# Patient Record
Sex: FEMALE | Race: WHITE
[De-identification: names, ages, dates, MRNs, and addresses within clinical notes are randomized per-mention and may not be internally consistent; named-entity substitution may affect disease eponyms.]

---

## 2018-07-25 ENCOUNTER — HOSPITAL ENCOUNTER (EMERGENCY)
Dept: HOSPITAL 45 - C.EDB | Age: 33
Discharge: HOME | End: 2018-07-25
Payer: COMMERCIAL

## 2018-07-25 VITALS — TEMPERATURE: 97.88 F

## 2018-07-25 VITALS — HEART RATE: 84 BPM | OXYGEN SATURATION: 100 % | DIASTOLIC BLOOD PRESSURE: 78 MMHG | SYSTOLIC BLOOD PRESSURE: 123 MMHG

## 2018-07-25 VITALS
BODY MASS INDEX: 21.94 KG/M2 | HEIGHT: 60.98 IN | WEIGHT: 116.18 LBS | HEIGHT: 60.98 IN | BODY MASS INDEX: 21.94 KG/M2 | WEIGHT: 116.18 LBS
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
## 2018-07-25 DIAGNOSIS — M25.532: Primary | ICD-10-CM
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
## 2018-07-25 DIAGNOSIS — F17.210: ICD-10-CM

## 2018-07-25 DIAGNOSIS — Z79.899: ICD-10-CM

## 2018-07-25 NOTE — EMERGENCY ROOM VISIT NOTE
History


First contact with patient:  14:07


Chief Complaint:  HAND PAIN/INJURY


Stated Complaint:  LEFT HAND PAIN FROM FALL





History of Present Illness


The patient is a 33 year old female who presents to the Emergency Room via 

private vehicle with complaints of "left hand pain from fall".  The patient 

states that today while at home she tripped, fell and landed on an outstretched 

left hand.  She notes pain down the left wrist.  She states that it hurts to 

move the left wrist.  She rates the overall pain is a 7/10.  She denies 

striking her head, loss of consciousness or syncopal event.  She is right-hand 

dominant.





Review of Systems


A complete 6-point Review of Systems was discussed with the patient, with 

pertinent positives and negatives listed in the History of Present Illness. All 

remaining Review of Systems questions can be considered negative unless 

otherwise specified.





Past Medical/Surgical History


No pertinent.





Family History


No pertinent.





Social History


Smoking Status:  Current Every Day Smoker


Housing Status:  lives with family


Occupation Status:  employed


Patient lives locally.





Current/Historical Medications


Scheduled


Ergocalciferol (Vitamin D 90485 Unit), 1 CAP PO WK


Venlafaxine Hcl (Effexor), 1 TAB PO BID





Scheduled PRN


Hydrocodone/Acetaminophen 5MG/325MG (Norco 5MG/325MG), 1 TABLET PO Q6 PRN for 

Pain





Miscellaneous Medications


Levonorgestrel (Iud) (Mirena)





Physical Exam


Vital Signs











  Date Time  Temp Pulse Resp B/P (MAP) Pulse Ox O2 Delivery O2 Flow Rate FiO2


 


7/25/18 15:10  84 16 123/78 100   


 


7/25/18 13:57 36.6 82 20 120/74 99 Room Air  











Physical Exam


VITAL SIGNS - Vital signs and nursing notes were reviewed.  Stable.  Afebrile.


GENERAL -33-year-old female appearing her stated age who is in no acute 

distress. Communicates well with provider and answers questions appropriately.


SKIN - Without rashes.  No meningeal or petechial rash.  The skin overlying the 

left wrist is unremarkable.  No bony deformity noted.


HEAD - NC/AT.


EXTREMITIES - No clubbing or peripheral cyanosis.  Skin as noted above.  

Throughout her stay the left arm did have some bruising noted in the left 

dorsal wrist/proximal hand region.  There is no direct bony tenderness.  

Generalized tenderness noted about the left wrist and distal forearm.  There is 

also proximal palmar hand pain.  No finger pain or proximal forearm/elbow 

tenderness.  She is neurovascularly intact distally.  Decreased range of motion 

about the left wrist noted.





Medical Decision & Procedures


ER Provider


Diagnostic Interpretation:


L HAND MIN 3 VIEWS ROUTINE





HISTORY:  33 years-old Female L arm pain s/p fall acute posterior back left hand


pain with history of recent fall





COMPARISON: Left forearm radiograph of same day





TECHNIQUE: 3 views of the left hand





FINDINGS: 


There is mild dorsal hand and wrist soft tissue swelling. No acute fracture,


dislocation or significant degenerative changes. No opaque foreign body.





IMPRESSION: Mild soft tissue swelling without fracture. 











The above report was generated using voice recognition software. It may contain


grammatical, syntax or spelling errors.











Electronically signed by:  Dev Valentin M.D.


7/25/2018 2:25 PM





Dictated Date/Time:  7/25/2018 2:22 PM





L FOREARM 2 VIEWS ROUTINE





CLINICAL HISTORY: L arm pain s/p fall trauma. Pain.





COMPARISON: None.





DISCUSSION: The bones and joint spaces appear intact. There is no evidence of


fracture, dislocation or bony disease. There is no evidence for soft tissue


swelling.





IMPRESSION: Negative study.

















The above report was generated using voice recognition software.  It may contain


grammatical, syntax or spelling errors.











Electronically signed by:  Reed Way M.D.


7/25/2018 2:23 PM





Dictated Date/Time:  7/25/2018 2:22 PM





Medical Decision


Patient was seen and evaluated as above in room D7. Review was performed of 

nursing notes and vital signs. After obtaining a thorough history and physical 

examination the above work up was performed.  She presents to us today with 

left wrist/proximal hand pain status post fall.  She is nontoxic on exam.  Ice 

packs were applied.  She declined pain medication initially.  X-ray results as 

above.  These were reviewed by myself and the radiologist.  No acute fracture.  

I discussed the results with the patient.  She was educated upon risk of occult 

fracture.  She was splinted with a Velcro wrist lacer.  She is to follow with 

orthopedics if persistence of pain in 5-7 days or return with worsening.  She 

was educated upon worrisome symptoms which to return.  I suspect most likely 

left wrist sprain/contusion.  Patient notes she is tried over-the-counter pain 

medication prior to coming here with minimal relief.  She requested something 

stronger.  There are no red flags in the [place] drug monitoring system 

therefore a very short supply was provided to the patient of [place].  She was 

educated to not drive with this as well as risks.  The patient was educated 

upon management, had questions answered prior to discharge, and was discharged 

home in good condition.





In the evaluation and treatment of this patient, the following differential 

diagnoses were considered: Wrist Sprain, Wrist Fracture, Wrist Dislocation, 

Scapholunate Dissociation, Carpal Fracture, Metacarpal Fracture, Radial Styloid 

Process Fracture, Ulnar Styloid Process Fracture, or Carpal Tunnel Syndrome.





Impression





 Primary Impression:  


 Fall


 Additional Impression:  


 Wrist pain, left





Departure Information


Dispostion


Home / Self-Care





Condition


GOOD





Prescriptions





Hydrocodone/Acetaminophen 5MG/325MG (Norco 5MG/325MG)  Tab


1 TABLET PO Q6 Y for Pain, #9 TAB


   For Initial Treatment


   Prov: Rico Rutherford PA-C         7/25/18





Referrals


No Doctor, Assigned (PCP)








Roque Ken MD





Patient Instructions


My [facility]





Additional Instructions





You have been treated in the Emergency Department for Wrist Pain. 





For pain control, you can use the following over-the-counter medicines (if >11 yo):





- Regular strength (325mg/tab) Tylenol (acetaminophen) 2 tabs every 4-6 hours 

as needed. Do not exceed 12 tablets in a 24 hour period. Avoid taking more than 

3 grams (3000 mg) of Tylenol per day. This includes any other sources of 

acetaminophen you may take on a regular basis.





- Regular strength (200 mg/tab) Advil (ibuprofen) 1-2 tabs every 4-6 hours as 

needed. Do not exceed a dose of 3200 mg per day.





If this is a recent injury (<24 hrs), ice can be applied to the area of pain 

for the first 3 days to help decrease pain and inflammation.





You have been provided the number for an Orthopaedic Surgeon. You should call 

this number as soon as possible to establish a follow-up visit from today's 

Emergency Department visit.





Keep the brace/splint in place until evaluated by Orthopedics.





Return to the Emergency Department if your current symptoms worsen despite 

treatment course outlined above, or if you develop any of the following symptoms

: intractable pain despite aforementioned treatment course or new onset of 

numbness or tingling of the fingers.





Problem Qualifiers

## 2018-07-25 NOTE — DIAGNOSTIC IMAGING REPORT
L HAND MIN 3 VIEWS ROUTINE



HISTORY:  33 years-old Female L arm pain s/p fall acute posterior back left hand

pain with history of recent fall



COMPARISON: Left forearm radiograph of same day



TECHNIQUE: 3 views of the left hand



FINDINGS: 

There is mild dorsal hand and wrist soft tissue swelling. No acute fracture,

dislocation or significant degenerative changes. No opaque foreign body.



IMPRESSION: Mild soft tissue swelling without fracture. 







The above report was generated using voice recognition software. It may contain

grammatical, syntax or spelling errors.







Electronically signed by:  Dev Valentin M.D.

7/25/2018 2:25 PM



Dictated Date/Time:  7/25/2018 2:22 PM

## 2018-07-25 NOTE — DIAGNOSTIC IMAGING REPORT
L FOREARM 2 VIEWS ROUTINE



CLINICAL HISTORY: L arm pain s/p fall trauma. Pain.



COMPARISON: None.



DISCUSSION: The bones and joint spaces appear intact. There is no evidence of

fracture, dislocation or bony disease. There is no evidence for soft tissue

swelling.



IMPRESSION: Negative study.











The above report was generated using voice recognition software.  It may contain

grammatical, syntax or spelling errors.







Electronically signed by:  Reed Way M.D.

7/25/2018 2:23 PM



Dictated Date/Time:  7/25/2018 2:22 PM